# Patient Record
Sex: MALE | Race: WHITE | Employment: FULL TIME | URBAN - METROPOLITAN AREA
[De-identification: names, ages, dates, MRNs, and addresses within clinical notes are randomized per-mention and may not be internally consistent; named-entity substitution may affect disease eponyms.]

---

## 2017-01-03 ENCOUNTER — OFFICE VISIT (OUTPATIENT)
Dept: SURGERY | Facility: CLINIC | Age: 52
End: 2017-01-03

## 2017-01-03 VITALS
WEIGHT: 197 LBS | DIASTOLIC BLOOD PRESSURE: 82 MMHG | RESPIRATION RATE: 16 BRPM | BODY MASS INDEX: 33.63 KG/M2 | SYSTOLIC BLOOD PRESSURE: 124 MMHG | HEIGHT: 64 IN | HEART RATE: 72 BPM

## 2017-01-03 DIAGNOSIS — D35.2 PITUITARY MACROADENOMA WITH EXTRASELLAR EXTENSION (HCC): Primary | ICD-10-CM

## 2017-01-03 DIAGNOSIS — E89.3 STATUS POST TRANSSPHENOIDAL PITUITARY RESECTION (HCC): ICD-10-CM

## 2017-01-03 PROCEDURE — 99212 OFFICE O/P EST SF 10 MIN: CPT | Performed by: NEUROLOGICAL SURGERY

## 2017-01-03 RX ORDER — OMEPRAZOLE 20 MG/1
20 CAPSULE, DELAYED RELEASE ORAL DAILY
Refills: 0 | COMMUNITY
Start: 2016-12-13 | End: 2017-10-02

## 2017-01-03 RX ORDER — FLUTICASONE PROPIONATE 50 MCG
SPRAY, SUSPENSION (ML) NASAL
Refills: 0 | COMMUNITY
Start: 2016-12-17 | End: 2019-07-09 | Stop reason: ALTCHOICE

## 2017-01-03 RX ORDER — IBUPROFEN 600 MG/1
600 TABLET ORAL AS NEEDED
Refills: 0 | COMMUNITY
Start: 2016-12-15 | End: 2018-07-21

## 2017-01-03 NOTE — PATIENT INSTRUCTIONS
,  Refill policies:    • Allow 2 business days for refills; controlled substances may take longer.   • Contact your pharmacy at least 5 days prior to running out of medication and have them send an electronic request or submit request through the “request r Authorizations  If your physician has recommended that you have a procedure or additional testing performed. [facility]) will contact your insurance carrier to obtain pre-certification or prior authorization.     Unfortunately, [facility]

## 2017-01-03 NOTE — PROGRESS NOTES
Neurosurgery Clinic Visit  1/3/2017    Kely Taylor PCP:  Alejandra Smiley MD    1965 MRN [id number]     HISTORY OF PRESENT ILLNESS:  Kely Taylor is a(n) 46year old male who is here s/p trans-sphenoidal pituitary macroadenoma rese

## 2017-01-05 ENCOUNTER — TELEPHONE (OUTPATIENT)
Dept: NEUROLOGY | Facility: CLINIC | Age: 52
End: 2017-01-05

## 2017-01-05 PROCEDURE — 82533 TOTAL CORTISOL: CPT | Performed by: INTERNAL MEDICINE

## 2017-01-05 PROCEDURE — 84305 ASSAY OF SOMATOMEDIN: CPT | Performed by: INTERNAL MEDICINE

## 2017-01-05 PROCEDURE — 84402 ASSAY OF FREE TESTOSTERONE: CPT | Performed by: INTERNAL MEDICINE

## 2017-01-05 PROCEDURE — 84403 ASSAY OF TOTAL TESTOSTERONE: CPT | Performed by: INTERNAL MEDICINE

## 2017-01-05 PROCEDURE — 83930 ASSAY OF BLOOD OSMOLALITY: CPT | Performed by: INTERNAL MEDICINE

## 2017-01-05 PROCEDURE — 83935 ASSAY OF URINE OSMOLALITY: CPT | Performed by: INTERNAL MEDICINE

## 2017-01-05 NOTE — TELEPHONE ENCOUNTER
Spoke to Dasia's at [facility], MRI Pituitary no Misti Dalia is needed.  call CUEVAS#[phone] call time 6:52    Called pt and gave him detail message

## 2017-01-16 NOTE — IMAGING NOTE
I spoke w/pt. Denies asthma or inhaler use. Uses nasal sprays but not inhalers. States he had MRI last year w/contrast w/no problems.  CKRN Done

## 2017-01-18 ENCOUNTER — HOSPITAL ENCOUNTER (OUTPATIENT)
Dept: MRI IMAGING | Age: 52
Discharge: HOME | End: 2017-01-18
Attending: NEUROLOGICAL SURGERY
Payer: MEDICAID

## 2017-01-18 DIAGNOSIS — D35.2 PITUITARY MACROADENOMA WITH EXTRASELLAR EXTENSION (HCC): ICD-10-CM

## 2017-01-18 DIAGNOSIS — E89.3 STATUS POST TRANSSPHENOIDAL PITUITARY RESECTION (HCC): ICD-10-CM

## 2017-01-18 PROCEDURE — A9575 INJ GADOTERATE MEGLUMI 0.1ML: HCPCS | Performed by: NEUROLOGICAL SURGERY

## 2017-01-18 PROCEDURE — 70553 MRI BRAIN STEM W/O & W/DYE: CPT

## 2017-01-26 ENCOUNTER — OFFICE VISIT (OUTPATIENT)
Dept: SURGERY | Facility: CLINIC | Age: 52
End: 2017-01-26

## 2017-01-26 VITALS
DIASTOLIC BLOOD PRESSURE: 68 MMHG | SYSTOLIC BLOOD PRESSURE: 120 MMHG | WEIGHT: 180 LBS | HEIGHT: 64 IN | RESPIRATION RATE: 14 BRPM | HEART RATE: 78 BPM | BODY MASS INDEX: 30.73 KG/M2

## 2017-01-26 DIAGNOSIS — D35.2 PITUITARY MACROADENOMA (HCC): Primary | ICD-10-CM

## 2017-01-26 DIAGNOSIS — E89.3 STATUS POST TRANSSPHENOIDAL PITUITARY RESECTION (HCC): ICD-10-CM

## 2017-01-26 PROCEDURE — 99213 OFFICE O/P EST LOW 20 MIN: CPT | Performed by: NEUROLOGICAL SURGERY

## 2017-01-26 RX ORDER — NAPROXEN 500 MG/1
1 TABLET ORAL
Refills: 0 | COMMUNITY
Start: 2017-01-09 | End: 2018-07-21

## 2017-01-26 RX ORDER — CYCLOBENZAPRINE HCL 10 MG
10 TABLET ORAL
Refills: 0 | COMMUNITY
Start: 2017-01-09 | End: 2018-07-21

## 2017-01-26 NOTE — PATIENT INSTRUCTIONS
Refill policies:    • Allow 2 business days for refills; controlled substances may take longer.   • Contact your pharmacy at least 5 days prior to running out of medication and have them send an electronic request or submit request through the “request re your physician has recommended that you have a procedure or additional testing performed. [facility]) will contact your insurance carrier to obtain pre-certification or prior authorization.     Unfortunately, [facility] has seen an increas

## 2017-01-26 NOTE — PROGRESS NOTES
Neurosurgery Clinic Visit  2017    Keiko Romano PCP:  Keo Pulido MD    1965 MRN [id number]     HISTORY OF PRESENT ILLNESS:  Keiko Romano is a(n) 46year old male who is here s/p trans-sphenoidal pituitary macroadenoma res

## 2017-01-26 NOTE — PROGRESS NOTES
Pt states he has been feeling better, has not been having any headaches, nausea, or any other symptoms

## 2017-02-21 ENCOUNTER — TELEPHONE (OUTPATIENT)
Dept: SURGERY | Facility: CLINIC | Age: 52
End: 2017-02-21

## 2017-05-11 ENCOUNTER — OFFICE VISIT (OUTPATIENT)
Dept: SURGERY | Facility: CLINIC | Age: 52
End: 2017-05-11

## 2017-05-11 VITALS — SYSTOLIC BLOOD PRESSURE: 110 MMHG | HEART RATE: 80 BPM | DIASTOLIC BLOOD PRESSURE: 80 MMHG

## 2017-05-11 DIAGNOSIS — E89.3 STATUS POST TRANSSPHENOIDAL PITUITARY RESECTION (HCC): ICD-10-CM

## 2017-05-11 DIAGNOSIS — R51.9 HEADACHE, UNSPECIFIED HEADACHE TYPE: Primary | ICD-10-CM

## 2017-05-11 PROCEDURE — 99213 OFFICE O/P EST LOW 20 MIN: CPT | Performed by: NEUROLOGICAL SURGERY

## 2017-05-11 NOTE — PATIENT INSTRUCTIONS
Refill policies:    • Allow 2 business days for refills; controlled substances may take longer.   • Contact your pharmacy at least 5 days prior to running out of medication and have them send an electronic request or submit request through the “request re insurance carrier to obtain pre-certification or prior authorization. Unfortunately, [facility] has seen an increase in denial of payment even though the procedure/test has been pre-certified.   You are strongly encouraged to contact your insurance carrier to v

## 2017-05-11 NOTE — PROGRESS NOTES
Pt states he has been having increased headaches for the past 2 weeks, pain currently 7-8/10  Pt states he has been having difficulty with memory, he is also having anxiety due to memory problems. Pt notices he begins to have headaches when he bends over.

## 2017-05-11 NOTE — PROGRESS NOTES
Neurosurgery Clinic Visit  2017    Mesfin Garcia PCP:  Concha Burgess MD    1965 MRN [id number]     HISTORY OF PRESENT ILLNESS:  Mesfin Garcia is a(n) 46year old male here for follow-up  Patient's been having headaches on the

## 2017-06-01 ENCOUNTER — OFFICE VISIT (OUTPATIENT)
Dept: NEUROLOGY | Facility: CLINIC | Age: 52
End: 2017-06-01

## 2017-06-01 VITALS
BODY MASS INDEX: 35 KG/M2 | WEIGHT: 204 LBS | HEART RATE: 70 BPM | DIASTOLIC BLOOD PRESSURE: 76 MMHG | SYSTOLIC BLOOD PRESSURE: 118 MMHG | RESPIRATION RATE: 14 BRPM

## 2017-06-01 DIAGNOSIS — G44.209 TENSION-TYPE HEADACHE, NOT INTRACTABLE, UNSPECIFIED CHRONICITY PATTERN: Primary | ICD-10-CM

## 2017-06-01 DIAGNOSIS — E89.3 STATUS POST TRANSSPHENOIDAL PITUITARY RESECTION (HCC): ICD-10-CM

## 2017-06-01 DIAGNOSIS — D35.2 PITUITARY MACROADENOMA WITH EXTRASELLAR EXTENSION (HCC): ICD-10-CM

## 2017-06-01 PROCEDURE — 99244 OFF/OP CNSLTJ NEW/EST MOD 40: CPT | Performed by: OTHER

## 2017-06-01 RX ORDER — GABAPENTIN 100 MG/1
100 CAPSULE ORAL 3 TIMES DAILY
Qty: 90 CAPSULE | Refills: 1 | Status: SHIPPED | OUTPATIENT
Start: 2017-06-01 | End: 2017-06-26

## 2017-06-01 NOTE — PROGRESS NOTES
Patient here for evaluation of headaches over the lat 2-3 weeks. Pain is in the back of head and radiates to the front. Does have a history of a pituitary resection in 6/2015. Here to discuss the next steps.

## 2017-06-01 NOTE — PROGRESS NOTES
[facility] OUTPATIENT NEUROLOGY CONSULTATION    Date of consult: 6/1/2017    CC: headache    HPI: Kely Taylor is a 46year old male with past medical history as listed below presents here for initial evaluation of headaches over the lat 2-3 weeks.  Pain by Nasal route every 2 (two) hours while awake., Disp: 1 Bottle, Rfl: 5  •  HYDROcodone-acetaminophen (NORCO) 5-325 MG Oral Tab, Take 1 tablet by mouth as needed.   , Disp: , Rfl: 0  Allergies:    Vancomycin              Itching    Comment:Patient reported symmetry  VIII hearing normal  IX, X, XI palate elevates symmetric   XII tongue midline, normal motility, no atrophy  Motor strength: 5/5 all extremities  Tone: normal  DTRs: 2+ symmetric  Plantar response: bilateral flexor  Coordination: Normal FTN  Senso

## 2017-06-01 NOTE — PATIENT INSTRUCTIONS
Refill policies:    • Allow 2-3 business days for refills; controlled substances may take longer.   • Contact your pharmacy at least 5 days prior to running out of medication and have them send an electronic request or submit request through the [facility] have a procedure or additional testing performed. [facility]) will contact your insurance carrier to obtain pre-certification or prior authorization.     Unfortunately, [facility] has seen an increase in denial of payment even though the p

## 2017-06-06 ENCOUNTER — TELEPHONE (OUTPATIENT)
Dept: NEUROLOGY | Facility: CLINIC | Age: 52
End: 2017-06-06

## 2017-06-06 NOTE — TELEPHONE ENCOUNTER
Patient states he started medication Gabapentin yesterday, he notes he took three caps yesterday am, noon and pm. Patient states he experienced numbness on top of his head last night which he stated was \"very bad\".  Patient reports he is feeling better to

## 2017-06-06 NOTE — TELEPHONE ENCOUNTER
Spoke with pateint and relayed Dr Angela Gonzalez response below. Patient will call office with update if symptoms persist or gets worse. F/u appt scheduled with Dr Chloe Farnsworth 6/26/17.

## 2017-06-15 ENCOUNTER — NURSE ONLY (OUTPATIENT)
Dept: NEUROLOGY | Facility: CLINIC | Age: 52
End: 2017-06-15

## 2017-06-15 DIAGNOSIS — R51.9 HEADACHE, UNSPECIFIED HEADACHE TYPE: Primary | ICD-10-CM

## 2017-06-15 DIAGNOSIS — D35.2 PITUITARY MACROADENOMA WITH EXTRASELLAR EXTENSION (HCC): ICD-10-CM

## 2017-06-15 DIAGNOSIS — E89.3 STATUS POST TRANSSPHENOIDAL PITUITARY RESECTION (HCC): ICD-10-CM

## 2017-06-15 PROCEDURE — 95816 EEG AWAKE AND DROWSY: CPT | Performed by: OTHER

## 2017-06-16 NOTE — PROCEDURES
Date of Procedure: 6/15/2017    Procedure: EEG (ELECTROENCEPHALOGRAM)     DX: HEADACHE  HX: PT IS A 52 Y/O MALE THAT PRESENTS FOR RECENT ONSET OF SEVERE HEADACHE THAT BEGAN APPROX 1 MO AGO.  PT PMH OF PITUITARY RESECTION 6/2015, S/P TRANSSPHENOIDAL HYPOPHYS

## 2017-06-20 ENCOUNTER — TELEPHONE (OUTPATIENT)
Dept: NEUROLOGY | Facility: CLINIC | Age: 52
End: 2017-06-20

## 2017-06-20 NOTE — TELEPHONE ENCOUNTER
----- Message from Edouard Hdz MD sent at 6/19/2017  3:17 PM CDT -----  eeg unremarkable. Left detailed message on confidential voicemail (ok per HIPAA)relaying above. Encouraged patient to keep upcoming appt to discuss in more detail and next steps.

## 2017-06-26 ENCOUNTER — OFFICE VISIT (OUTPATIENT)
Dept: NEUROLOGY | Facility: CLINIC | Age: 52
End: 2017-06-26

## 2017-06-26 VITALS
SYSTOLIC BLOOD PRESSURE: 110 MMHG | DIASTOLIC BLOOD PRESSURE: 80 MMHG | WEIGHT: 203 LBS | HEART RATE: 72 BPM | BODY MASS INDEX: 35 KG/M2

## 2017-06-26 DIAGNOSIS — G44.209 TENSION-TYPE HEADACHE, NOT INTRACTABLE, UNSPECIFIED CHRONICITY PATTERN: Primary | ICD-10-CM

## 2017-06-26 DIAGNOSIS — E89.3 STATUS POST TRANSSPHENOIDAL PITUITARY RESECTION (HCC): ICD-10-CM

## 2017-06-26 PROCEDURE — 99215 OFFICE O/P EST HI 40 MIN: CPT | Performed by: OTHER

## 2017-06-26 RX ORDER — TERBINAFINE HYDROCHLORIDE 250 MG/1
TABLET ORAL
Refills: 0 | COMMUNITY
Start: 2017-06-05

## 2017-06-26 RX ORDER — GABAPENTIN 100 MG/1
200 CAPSULE ORAL 3 TIMES DAILY
Qty: 180 CAPSULE | Refills: 2 | Status: SHIPPED | OUTPATIENT
Start: 2017-06-26 | End: 2017-10-02 | Stop reason: DRUGHIGH

## 2017-06-26 NOTE — PATIENT INSTRUCTIONS
Refill policies:    • Allow 2-3 business days for refills; controlled substances may take longer.   • Contact your pharmacy at least 5 days prior to running out of medication and have them send an electronic request or submit request through the [facility] have a procedure or additional testing performed. [facility]) will contact your insurance carrier to obtain pre-certification or prior authorization.     Unfortunately, LOR has seen an increase in denial of payment even though the p

## 2017-06-26 NOTE — PROGRESS NOTES
[facility] Neurology outpatient progress note  Date of service: 6/26/2017    Pt is here for follow up re: headache, since last visit, neurontin seems helping with gradually titrated up dosage. No side effect reported. EEG was unremarkable.   Elvin Hat[employer], Disp: , Rfl: 0  •  omeprazole 20 MG Oral Capsule Delayed Release, Take 20 mg by mouth daily. , Disp: , Rfl: 0  •  ibuprofen 600 MG Oral Tab, Take 600 mg by mouth as needed. , Disp: , Rfl: 0  •  HYDROcodone-acetaminophen (NORCO) 5-325 MG Oral Tab, bilateral flexor   Sensory - nl   Coordination - nl   Gait - nl   Romberg - negative   Neck - supple    Test reviewed on 6/26/2017    A/P:   (G44.209) Tension-type headache, not intractable, unspecified chronicity pattern  (primary encounter diagnosis): im

## 2017-09-15 ENCOUNTER — APPOINTMENT (OUTPATIENT)
Dept: GENERAL RADIOLOGY | Age: 52
End: 2017-09-15
Attending: EMERGENCY MEDICINE
Payer: MEDICAID

## 2017-09-15 ENCOUNTER — HOSPITAL ENCOUNTER (EMERGENCY)
Age: 52
Discharge: HOME | End: 2017-09-15
Attending: EMERGENCY MEDICINE
Payer: MEDICAID

## 2017-09-15 VITALS
OXYGEN SATURATION: 99 % | WEIGHT: 200 LBS | HEIGHT: 63 IN | DIASTOLIC BLOOD PRESSURE: 88 MMHG | SYSTOLIC BLOOD PRESSURE: 128 MMHG | RESPIRATION RATE: 16 BRPM | TEMPERATURE: 97 F | HEART RATE: 65 BPM | BODY MASS INDEX: 35.44 KG/M2

## 2017-09-15 DIAGNOSIS — S61.313A LACERATION OF LEFT MIDDLE FINGER WITHOUT FOREIGN BODY WITH DAMAGE TO NAIL, INITIAL ENCOUNTER: Primary | ICD-10-CM

## 2017-09-15 DIAGNOSIS — S62.635A CLOSED DISPLACED FRACTURE OF DISTAL PHALANX OF LEFT RING FINGER, INITIAL ENCOUNTER: ICD-10-CM

## 2017-09-15 DIAGNOSIS — S62.633B OPEN DISPLACED FRACTURE OF DISTAL PHALANX OF LEFT MIDDLE FINGER, INITIAL ENCOUNTER: ICD-10-CM

## 2017-09-15 DIAGNOSIS — S60.10XA SUBUNGUAL HEMATOMA OF FINGERNAIL, INITIAL ENCOUNTER: ICD-10-CM

## 2017-09-15 PROCEDURE — 26750 TREAT FINGER FRACTURE EACH: CPT

## 2017-09-15 PROCEDURE — 99284 EMERGENCY DEPT VISIT MOD MDM: CPT

## 2017-09-15 PROCEDURE — 99283 EMERGENCY DEPT VISIT LOW MDM: CPT

## 2017-09-15 PROCEDURE — 73130 X-RAY EXAM OF HAND: CPT | Performed by: EMERGENCY MEDICINE

## 2017-09-15 PROCEDURE — 12002 RPR S/N/AX/GEN/TRNK2.6-7.5CM: CPT

## 2017-09-15 PROCEDURE — 11740 EVACUATION SUBUNGUAL HMTMA: CPT

## 2017-09-15 RX ORDER — HYDROCODONE BITARTRATE AND ACETAMINOPHEN 5; 325 MG/1; MG/1
1-2 TABLET ORAL EVERY 6 HOURS PRN
Qty: 20 TABLET | Refills: 0 | Status: SHIPPED | OUTPATIENT
Start: 2017-09-15 | End: 2017-09-19

## 2017-09-15 RX ORDER — HYDROCODONE BITARTRATE AND ACETAMINOPHEN 5; 325 MG/1; MG/1
2 TABLET ORAL ONCE
Status: COMPLETED | OUTPATIENT
Start: 2017-09-15 | End: 2017-09-15

## 2017-09-15 RX ORDER — CLINDAMYCIN HYDROCHLORIDE 300 MG/1
300 CAPSULE ORAL 3 TIMES DAILY
Qty: 30 CAPSULE | Refills: 0 | Status: SHIPPED | OUTPATIENT
Start: 2017-09-15 | End: 2017-09-25

## 2017-09-15 NOTE — ED PROVIDER NOTES
Patient Seen in: THE [facility] Emergency Department In [place]    History   Patient presents with:  Laceration    Stated Complaint:     HPI    The patient is a 80-year-old previously healthy right-hand-dominant male presenting to the emergency department due [09/15/17 1039]  Resp: 20 [09/15/17 1039]  Temp: (!) 97.3 °F (36.3 °C) [09/15/17 1039]  Temp src: Temporal [09/15/17 1039]  SpO2: 96 % [09/15/17 1039]  O2 Device: None (Room air) [09/15/17 1300]    Current:/88   Pulse 65   Temp (!) 97.3 °F (36.3 °C) least a total of 750 mL was used under high pressure. X-ray of his left hand was obtained.   Pictures were taken, and sent to Dr. Hilary Han of orthopedics on call who recommended ED closure via primary intention, prolactin antibiotics and outpatient hand fol explored for foreign bodies and none were found. The edges were approximated using 14 simple interrupted 4-0 nylon sutures and 3 horizontal mattress 4-0 nylon sutures. Bleeding was well-controlled. The patient tolerated the procedure well.   The patient victoria PM    START taking these medications    !! HYDROcodone-acetaminophen 5-325 MG Oral Tab  Take 1-2 tablets by mouth every 6 (six) hours as needed (Do not drive while on his this medication. It will make you sleepy.   Do not take with other Tylenol containing

## 2017-09-15 NOTE — ED INITIAL ASSESSMENT (HPI)
PTA PT LIFTING TRAILER AND CHAIN FROM TRAILER CAME DOWN  ONTO LEFT 3RD FINGER.  SUSTAINED LACERATION AND PARTIAL  DISLOCATION OF FINGERTIP

## 2017-09-17 ENCOUNTER — HOSPITAL ENCOUNTER (EMERGENCY)
Age: 52
Discharge: HOME | End: 2017-09-17
Attending: EMERGENCY MEDICINE
Payer: MEDICAID

## 2017-09-17 VITALS
SYSTOLIC BLOOD PRESSURE: 133 MMHG | HEIGHT: 68 IN | OXYGEN SATURATION: 98 % | BODY MASS INDEX: 30.31 KG/M2 | HEART RATE: 69 BPM | DIASTOLIC BLOOD PRESSURE: 75 MMHG | WEIGHT: 200 LBS | RESPIRATION RATE: 16 BRPM | TEMPERATURE: 98 F

## 2017-09-17 DIAGNOSIS — Z51.89 ENCOUNTER FOR WOUND RE-CHECK: Primary | ICD-10-CM

## 2017-09-17 PROCEDURE — 99281 EMR DPT VST MAYX REQ PHY/QHP: CPT

## 2017-09-17 NOTE — ED NOTES
Pt bandage removed. Pt teaching done on wound care. Pt had not removed bandage or cleaned wound since Friday. Fingers wet/white. Now left open to air and md at bedside.

## 2017-09-17 NOTE — ED PROVIDER NOTES
Patient Seen in: [facility] Emergency Department In [place]    History   Patient presents with:  Laceration Abrasion (integumentary)    Stated Complaint: wound recheck    HPI    66-year-old male presents for a wound check.   Patient injured his left middle noted in HPI. Constitutional and vital signs reviewed. All other systems reviewed and negative except as noted above. PSFH elements reviewed from today and agreed except as otherwise stated in HPI.     Physical Exam   ED Triage Vitals [09/17/17 103 Prescribed:  Current Discharge Medication List
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
## 2017-09-19 PROBLEM — S62.633A CLOSED DISPLACED FRACTURE OF DISTAL PHALANX OF LEFT MIDDLE FINGER, INITIAL ENCOUNTER: Status: ACTIVE | Noted: 2017-09-19

## 2017-09-19 PROBLEM — S62.665A CLOSED NONDISPLACED FRACTURE OF DISTAL PHALANX OF LEFT RING FINGER, INITIAL ENCOUNTER: Status: ACTIVE | Noted: 2017-09-19

## 2017-09-19 RX ORDER — SODIUM CHLORIDE, SODIUM LACTATE, POTASSIUM CHLORIDE, CALCIUM CHLORIDE 600; 310; 30; 20 MG/100ML; MG/100ML; MG/100ML; MG/100ML
INJECTION, SOLUTION INTRAVENOUS CONTINUOUS
Status: CANCELLED | OUTPATIENT
Start: 2017-09-19

## 2017-09-20 NOTE — H&P
Click to print Cherie Cordova 851 for scanning   Office Visit     9/19/2017  [address]   Saul Jamshid, [address]   Physician Assistant   Closed displaced fracture of distal phalanx of left middle finger, initial gabapentin 100 MG Oral Cap Take 2 capsules (200 mg total) by mouth 3 (three) times daily.  Ok to slowly titrate up to 200 mg tid po Disp: 180 capsule Rfl: 2   desmopressin acetate spray (DDAVP) 0.01 % Nasal Solution 1 spray (10 mcg total) by Nasal route argenis Ht 5' 4\" (1.626 m)   Wt 203 lb (92.1 kg)   BMI 34.84 kg/m²   GENERAL:normocehpalic  well developed, well nourished, and in no apparent distress  SKIN: no rashes,no suspicious lesions  MOUTH, NOSE: nasal mucosa pink w/o discharge.  Oropharynx is pink with n There is a nondisplaced proximal metaphyseal fracture of the distal phalanx of the ring finger. No angulation. There is also a tuft fracture of the distal phalanx of the ring finger as well without significant displacement or angulation.  No subluxation   o

## 2017-09-21 ENCOUNTER — SURGERY (OUTPATIENT)
Age: 52
End: 2017-09-21

## 2017-09-21 ENCOUNTER — HOSPITAL ENCOUNTER (OUTPATIENT)
Facility: HOSPITAL | Age: 52
Setting detail: HOSPITAL OUTPATIENT SURGERY
Discharge: HOME | End: 2017-09-21
Attending: ORTHOPAEDIC SURGERY | Admitting: ORTHOPAEDIC SURGERY
Payer: MEDICAID

## 2017-09-21 ENCOUNTER — APPOINTMENT (OUTPATIENT)
Dept: GENERAL RADIOLOGY | Facility: HOSPITAL | Age: 52
End: 2017-09-21
Attending: ORTHOPAEDIC SURGERY
Payer: MEDICAID

## 2017-09-21 VITALS
RESPIRATION RATE: 18 BRPM | SYSTOLIC BLOOD PRESSURE: 142 MMHG | OXYGEN SATURATION: 98 % | BODY MASS INDEX: 34.7 KG/M2 | HEIGHT: 64 IN | TEMPERATURE: 98 F | DIASTOLIC BLOOD PRESSURE: 95 MMHG | HEART RATE: 65 BPM | WEIGHT: 203.25 LBS

## 2017-09-21 PROCEDURE — 0PSV34Z REPOSITION LEFT FINGER PHALANX WITH INTERNAL FIXATION DEVICE, PERCUTANEOUS APPROACH: ICD-10-PCS | Performed by: ORTHOPAEDIC SURGERY

## 2017-09-21 PROCEDURE — 76001 XR FLUOROSCOPE EXAM >1 HR EXTENSIVE (CPT=76001): CPT | Performed by: ORTHOPAEDIC SURGERY

## 2017-09-21 DEVICE — WIRE K SMALL .035: Type: IMPLANTABLE DEVICE | Status: FUNCTIONAL

## 2017-09-21 RX ORDER — BUPIVACAINE HYDROCHLORIDE AND EPINEPHRINE 5; 5 MG/ML; UG/ML
INJECTION, SOLUTION EPIDURAL; INTRACAUDAL; PERINEURAL AS NEEDED
Status: DISCONTINUED | OUTPATIENT
Start: 2017-09-21 | End: 2017-09-21 | Stop reason: HOSPADM

## 2017-09-21 NOTE — BRIEF OP NOTE
Pre-Operative Diagnosis: LEFT MIDDLE FINGER DISTAL PHALANX FRACTURE     Post-Operative Diagnosis: * No post-op diagnosis entered *     Procedure Performed:   Procedure(s):  CLOSED REDUCTION AND PINNING LEFT MIDDLE FINGER DISTAL PHALANX    Surgeon(s) and

## 2017-09-24 ENCOUNTER — HOSPITAL ENCOUNTER (EMERGENCY)
Age: 52
Discharge: HOME | End: 2017-09-24
Attending: EMERGENCY MEDICINE
Payer: MEDICAID

## 2017-09-24 VITALS
DIASTOLIC BLOOD PRESSURE: 84 MMHG | HEART RATE: 76 BPM | HEIGHT: 64 IN | SYSTOLIC BLOOD PRESSURE: 147 MMHG | BODY MASS INDEX: 34.66 KG/M2 | WEIGHT: 203 LBS | OXYGEN SATURATION: 98 % | TEMPERATURE: 98 F | RESPIRATION RATE: 18 BRPM

## 2017-09-24 DIAGNOSIS — T50.905A PILL ESOPHAGITIS: Primary | ICD-10-CM

## 2017-09-24 DIAGNOSIS — K20.80 PILL ESOPHAGITIS: Primary | ICD-10-CM

## 2017-09-24 PROCEDURE — 99283 EMERGENCY DEPT VISIT LOW MDM: CPT

## 2017-09-24 RX ORDER — OMEPRAZOLE 20 MG/1
20 CAPSULE, DELAYED RELEASE ORAL DAILY
Qty: 30 CAPSULE | Refills: 0 | Status: SHIPPED | OUTPATIENT
Start: 2017-09-24 | End: 2017-10-24

## 2017-09-24 RX ORDER — MAGNESIUM HYDROXIDE/ALUMINUM HYDROXICE/SIMETHICONE 120; 1200; 1200 MG/30ML; MG/30ML; MG/30ML
30 SUSPENSION ORAL ONCE
Status: COMPLETED | OUTPATIENT
Start: 2017-09-24 | End: 2017-09-24

## 2017-09-24 RX ORDER — SUCRALFATE ORAL 1 G/10ML
1 SUSPENSION ORAL
Qty: 120 ML | Refills: 0 | Status: SHIPPED | OUTPATIENT
Start: 2017-09-24 | End: 2017-09-27

## 2017-09-24 NOTE — ED INITIAL ASSESSMENT (HPI)
Patient states that he took his antibiotic last night and woke up in the middle of the night he had burning from throat down to stomach and today has had a lot of pain when trying to eat or drink anything

## 2017-09-24 NOTE — ED PROVIDER NOTES
Patient Seen in: THE [facility] Emergency Department In [place]    History   Patient presents with:  Abdomen/Flank Pain (GI/)    Stated Complaint: Felt like pill got stuck last night, today having pain when eating    HPI    15-year-old male presents emergen Smokeless tobacco: Never Used                      Alcohol use: Yes           0.0 oz/week     Comment: rare      Review of Systems    Positive for stated complaint: Felt like pill got stuck last night, today having toni primary care and was given referral to GI. Return to ER if any change worsening symptoms.   Patient is well-appearing in discharge good condition      Disposition and Plan     Clinical Impression:  Pill esophagitis  (primary encounter diagnosis)    Disposi

## 2017-09-25 NOTE — OPERATIVE REPORT
[facility]    PATIENT'S NAME: Ade Raphael   ATTENDING PHYSICIAN: Justina Lemons M.D. OPERATING PHYSICIAN: Justina Lemons M.D.    PATIENT ACCOUNT#:   [de-identified]    LOCATION:  [address] [place] [address] 10  MEDICAL RECORD #:   [id number]

## 2017-10-02 ENCOUNTER — OFFICE VISIT (OUTPATIENT)
Dept: NEUROLOGY | Facility: CLINIC | Age: 52
End: 2017-10-02

## 2017-10-02 VITALS
DIASTOLIC BLOOD PRESSURE: 70 MMHG | HEART RATE: 72 BPM | WEIGHT: 202 LBS | SYSTOLIC BLOOD PRESSURE: 110 MMHG | BODY MASS INDEX: 35 KG/M2

## 2017-10-02 DIAGNOSIS — G47.33 OSA (OBSTRUCTIVE SLEEP APNEA): ICD-10-CM

## 2017-10-02 DIAGNOSIS — G44.209 TENSION-TYPE HEADACHE, NOT INTRACTABLE, UNSPECIFIED CHRONICITY PATTERN: Primary | ICD-10-CM

## 2017-10-02 PROCEDURE — 99215 OFFICE O/P EST HI 40 MIN: CPT | Performed by: OTHER

## 2017-10-02 RX ORDER — GABAPENTIN 300 MG/1
CAPSULE ORAL
Refills: 0 | COMMUNITY
Start: 2017-07-05 | End: 2017-10-02

## 2017-10-02 RX ORDER — POLYETHYLENE GLYCOL-3350 AND ELECTROLYTES 236; 6.74; 5.86; 2.97; 22.74 G/274.31G; G/274.31G; G/274.31G; G/274.31G; G/274.31G
POWDER, FOR SOLUTION ORAL
Refills: 0 | COMMUNITY
Start: 2017-08-25 | End: 2018-07-21

## 2017-10-02 RX ORDER — GABAPENTIN 300 MG/1
CAPSULE ORAL
Qty: 90 CAPSULE | Refills: 5 | Status: SHIPPED | OUTPATIENT
Start: 2017-10-02 | End: 2019-07-09 | Stop reason: ALTCHOICE

## 2017-10-02 NOTE — PROGRESS NOTES
Patient here to follow up for headaches. He states his migraines were under control but it got worse last week when he broke his two fingers on the left hand.  Patient stated his Gabapentin dosage was increased to 900 mg a day which is helping relieve his m

## 2017-10-02 NOTE — PROGRESS NOTES
[facility] Neurology outpatient progress note  Date of service: 10/2/2017    Patient here to follow up for headaches. He states his migraines were under control but it got worse last week when he broke his two fingers on the left hand.  Patient stated his Jeny Sequin Oral Tab, Take 1 tablet by mouth daily as needed. , Disp: , Rfl: 0  •  Fluticasone Propionate 50 MCG/ACT Nasal Suspension, uses as needed. WARNING - [employer] returned a non-compliant response. The tax information is out of balance.  GERMÁN Wt 202 lb   BMI 34.67 kg/m²   A & O X 3   Language - nl   Speech - nl   CN - intact   Motor 5/5 all extremities   Tone - nl   DTRs 2+ symmetric  Plantar response: bilateral flexor   Sensory - nl   Coordination - nl   Gait - nl   Romberg - negative   Neck

## 2017-10-02 NOTE — PATIENT INSTRUCTIONS
Refill policies:    • Allow 2-3 business days for refills; controlled substances may take longer.   • Contact your pharmacy at least 5 days prior to running out of medication and have them send an electronic request or submit request through the [facility] have a procedure or additional testing performed. [facility]) will contact your insurance carrier to obtain pre-certification or prior authorization.     Unfortunately, [facility] has seen an increase in denial of payment even though the p

## 2017-10-10 PROBLEM — S62.633D CLOSED DISPLACED FRACTURE OF DISTAL PHALANX OF LEFT MIDDLE FINGER WITH ROUTINE HEALING, SUBSEQUENT ENCOUNTER: Status: ACTIVE | Noted: 2017-10-10

## 2017-10-10 PROBLEM — S62.665D CLOSED NONDISPLACED FRACTURE OF DISTAL PHALANX OF LEFT RING FINGER WITH ROUTINE HEALING, SUBSEQUENT ENCOUNTER: Status: ACTIVE | Noted: 2017-10-10

## 2017-10-18 NOTE — ED AVS SNAPSHOT
Deann Deleonkelli   MRN: [id number]    Department:  THE [facility] Emergency Department in [place]   Date of Visit:  10/14/2018           Disclosure     Insurance plans vary and the physician(s) referred by the ER may not be covered by your plan.  Please Pt  Resting, no needs at this time  Pt  Reports pain is better  Awaiting ct scan  Will monitor       Vijaya Devries RN  10/18/17 9548 tell this physician (or your personal doctor if your instructions are to return to your personal doctor) about any new or lasting problems. The primary care or specialist physician will see patients referred from the [facility] Emergency Department.  Arthor Bernheim

## 2017-11-29 PROCEDURE — 83930 ASSAY OF BLOOD OSMOLALITY: CPT | Performed by: INTERNAL MEDICINE

## 2017-11-29 PROCEDURE — 83935 ASSAY OF URINE OSMOLALITY: CPT | Performed by: INTERNAL MEDICINE

## 2017-11-29 PROCEDURE — 84305 ASSAY OF SOMATOMEDIN: CPT | Performed by: INTERNAL MEDICINE

## 2017-11-29 PROCEDURE — 84402 ASSAY OF FREE TESTOSTERONE: CPT | Performed by: INTERNAL MEDICINE

## 2017-11-29 PROCEDURE — 84403 ASSAY OF TOTAL TESTOSTERONE: CPT | Performed by: INTERNAL MEDICINE

## 2017-11-29 PROCEDURE — 82533 TOTAL CORTISOL: CPT | Performed by: INTERNAL MEDICINE

## 2018-03-21 ENCOUNTER — TELEPHONE (OUTPATIENT)
Dept: SURGERY | Facility: CLINIC | Age: 53
End: 2018-03-21

## 2018-07-21 ENCOUNTER — APPOINTMENT (OUTPATIENT)
Dept: GENERAL RADIOLOGY | Age: 53
End: 2018-07-21
Attending: PHYSICIAN ASSISTANT
Payer: OTHER MISCELLANEOUS

## 2018-07-21 ENCOUNTER — HOSPITAL ENCOUNTER (EMERGENCY)
Age: 53
Discharge: HOME | End: 2018-07-21
Attending: EMERGENCY MEDICINE
Payer: OTHER MISCELLANEOUS

## 2018-07-21 VITALS
SYSTOLIC BLOOD PRESSURE: 133 MMHG | BODY MASS INDEX: 32.44 KG/M2 | OXYGEN SATURATION: 99 % | RESPIRATION RATE: 19 BRPM | HEART RATE: 71 BPM | DIASTOLIC BLOOD PRESSURE: 86 MMHG | WEIGHT: 190 LBS | HEIGHT: 64 IN | TEMPERATURE: 98 F

## 2018-07-21 DIAGNOSIS — S70.351A FOREIGN BODY OF RIGHT THIGH, INITIAL ENCOUNTER: Primary | ICD-10-CM

## 2018-07-21 DIAGNOSIS — Z18.9 RETAINED FOREIGN BODY: ICD-10-CM
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
## 2018-07-21 PROCEDURE — 99284 EMERGENCY DEPT VISIT MOD MDM: CPT

## 2018-07-21 PROCEDURE — 73560 X-RAY EXAM OF KNEE 1 OR 2: CPT | Performed by: PHYSICIAN ASSISTANT

## 2018-07-21 PROCEDURE — 10120 INC&RMVL FB SUBQ TISS SMPL: CPT

## 2018-07-21 PROCEDURE — 0JCL0ZZ EXTIRPATION OF MATTER FROM RIGHT UPPER LEG SUBCUTANEOUS TISSUE AND FASCIA, OPEN APPROACH: ICD-10-PCS | Performed by: PHYSICIAN ASSISTANT

## 2018-07-21 RX ORDER — BUPIVACAINE HYDROCHLORIDE 5 MG/ML
INJECTION, SOLUTION EPIDURAL; INTRACAUDAL
Status: COMPLETED
Start: 2018-07-21 | End: 2018-07-21

## 2018-07-21 RX ORDER — CEPHALEXIN 500 MG/1
500 CAPSULE ORAL 4 TIMES DAILY
Qty: 28 CAPSULE | Refills: 0 | Status: SHIPPED | OUTPATIENT
Start: 2018-07-21 | End: 2018-07-28

## 2018-07-21 NOTE — ED INITIAL ASSESSMENT (HPI)
Pt states he got a metal wire to his right thigh while he was at work on Tuesday. States Shelley Notice is still in there\".

## 2018-07-21 NOTE — ED PROVIDER NOTES
Patient Seen in: [facility] Emergency Department In [place]    History   Patient presents with:  FB in Skin (integumentary)    Stated Complaint: wire to right thigh, workman comp    59-year-old  male without significant past medical history presen SpO2: 97 %  O2 Device: None (Room air)    Current:/74   Pulse 77   Temp 98.4 °F (36.9 °C) (Temporal)   Resp 20   Ht 162.6 cm (5' 4\")   Wt 86.2 kg   SpO2 97%   BMI 32.61 kg/m²         Physical Exam   Constitutional: He appears well-developed and well Foreign body of right thigh, initial encounter  (primary encounter diagnosis)  Retained foreign body  Work related injury    Disposition:  There is no disposition on file for this visit. There is no disposition time on file for this visit.     Follow-up:

## 2018-07-24 PROBLEM — S70.351A: Status: ACTIVE | Noted: 2018-07-24

## 2018-08-01 ENCOUNTER — HOSPITAL (OUTPATIENT)
Dept: OTHER | Age: 53
End: 2018-08-01
Attending: ORTHOPAEDIC SURGERY

## 2018-10-14 ENCOUNTER — HOSPITAL ENCOUNTER (EMERGENCY)
Age: 53
Discharge: HOME | End: 2018-10-14
Attending: EMERGENCY MEDICINE
Payer: COMMERCIAL

## 2018-10-14 ENCOUNTER — APPOINTMENT (OUTPATIENT)
Dept: GENERAL RADIOLOGY | Age: 53
End: 2018-10-14
Attending: EMERGENCY MEDICINE
Payer: COMMERCIAL

## 2018-10-14 VITALS
HEART RATE: 72 BPM | HEIGHT: 62 IN | TEMPERATURE: 99 F | OXYGEN SATURATION: 97 % | SYSTOLIC BLOOD PRESSURE: 140 MMHG | DIASTOLIC BLOOD PRESSURE: 81 MMHG | RESPIRATION RATE: 18 BRPM | WEIGHT: 200 LBS | BODY MASS INDEX: 36.8 KG/M2

## 2018-10-14 DIAGNOSIS — M54.16 LUMBAR RADICULOPATHY: ICD-10-CM

## 2018-10-14 DIAGNOSIS — S16.1XXA STRAIN OF NECK MUSCLE, INITIAL ENCOUNTER: Primary | ICD-10-CM

## 2018-10-14 PROCEDURE — 73502 X-RAY EXAM HIP UNI 2-3 VIEWS: CPT | Performed by: EMERGENCY MEDICINE

## 2018-10-14 PROCEDURE — 72100 X-RAY EXAM L-S SPINE 2/3 VWS: CPT | Performed by: EMERGENCY MEDICINE

## 2018-10-14 PROCEDURE — 72040 X-RAY EXAM NECK SPINE 2-3 VW: CPT | Performed by: EMERGENCY MEDICINE

## 2018-10-14 PROCEDURE — 72072 X-RAY EXAM THORAC SPINE 3VWS: CPT | Performed by: EMERGENCY MEDICINE

## 2018-10-14 PROCEDURE — 99284 EMERGENCY DEPT VISIT MOD MDM: CPT

## 2018-10-14 PROCEDURE — 96372 THER/PROPH/DIAG INJ SC/IM: CPT

## 2018-10-14 RX ORDER — MORPHINE SULFATE 10 MG/ML
10 INJECTION, SOLUTION INTRAMUSCULAR; INTRAVENOUS EVERY 4 HOURS PRN
Status: DISCONTINUED | OUTPATIENT
Start: 2018-10-14 | End: 2018-10-14

## 2018-10-14 RX ORDER — PREDNISONE 20 MG/1
40 TABLET ORAL DAILY
Qty: 10 TABLET | Refills: 0 | Status: SHIPPED | OUTPATIENT
Start: 2018-10-14 | End: 2018-10-19

## 2018-10-14 RX ORDER — KETOROLAC TROMETHAMINE 30 MG/ML
60 INJECTION, SOLUTION INTRAMUSCULAR; INTRAVENOUS ONCE
Status: COMPLETED | OUTPATIENT
Start: 2018-10-14 | End: 2018-10-14

## 2018-10-14 RX ORDER — HYDROCODONE BITARTRATE AND ACETAMINOPHEN 5; 325 MG/1; MG/1
1-2 TABLET ORAL EVERY 4 HOURS PRN
Qty: 10 TABLET | Refills: 0 | Status: SHIPPED | OUTPATIENT
Start: 2018-10-14 | End: 2018-10-21
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
## 2019-02-25 PROCEDURE — 83930 ASSAY OF BLOOD OSMOLALITY: CPT | Performed by: INTERNAL MEDICINE

## 2019-02-25 PROCEDURE — 84403 ASSAY OF TOTAL TESTOSTERONE: CPT | Performed by: INTERNAL MEDICINE

## 2019-02-25 PROCEDURE — 83935 ASSAY OF URINE OSMOLALITY: CPT | Performed by: INTERNAL MEDICINE

## 2019-02-25 PROCEDURE — 84402 ASSAY OF FREE TESTOSTERONE: CPT | Performed by: INTERNAL MEDICINE

## 2019-07-09 ENCOUNTER — OFFICE VISIT (OUTPATIENT)
Dept: SURGERY | Facility: CLINIC | Age: 54
End: 2019-07-09
Payer: COMMERCIAL

## 2019-07-09 VITALS — HEART RATE: 68 BPM | DIASTOLIC BLOOD PRESSURE: 72 MMHG | SYSTOLIC BLOOD PRESSURE: 124 MMHG

## 2019-07-09 DIAGNOSIS — D35.2 PITUITARY ADENOMA (HCC): Primary | ICD-10-CM

## 2019-07-09 PROCEDURE — 99213 OFFICE O/P EST LOW 20 MIN: CPT | Performed by: NEUROLOGICAL SURGERY

## 2019-07-09 RX ORDER — OMEPRAZOLE 20 MG/1
CAPSULE, DELAYED RELEASE ORAL
Refills: 2 | COMMUNITY
Start: 2019-07-01

## 2019-07-09 RX ORDER — FUROSEMIDE 20 MG/1
TABLET ORAL
COMMUNITY
Start: 2019-07-06

## 2019-07-09 NOTE — PROGRESS NOTES
Neurosurgery Clinic Visit  2019    Wendy Whitney PCP:  Princess Yolanda MD    1965 MRN [id number]     HISTORY OF PRESENT ILLNESS:  Wendy Whitney is a(n) 48year old male here for follow-up status post pituitary resection back in 2

## 2020-03-17 ENCOUNTER — HOSPITAL ENCOUNTER (OUTPATIENT)
Dept: MRI IMAGING | Age: 55
Discharge: HOME | End: 2020-03-17
Attending: NEUROLOGICAL SURGERY
Payer: COMMERCIAL

## 2020-03-17 DIAGNOSIS — D35.2 PITUITARY ADENOMA (HCC): ICD-10-CM

## 2020-03-17 PROCEDURE — 70553 MRI BRAIN STEM W/O & W/DYE: CPT | Performed by: NEUROLOGICAL SURGERY

## 2020-03-17 PROCEDURE — A9575 INJ GADOTERATE MEGLUMI 0.1ML: HCPCS | Performed by: NEUROLOGICAL SURGERY

## 2020-03-19 ENCOUNTER — TELEPHONE (OUTPATIENT)
Dept: SURGERY | Facility: CLINIC | Age: 55
End: 2020-03-19

## 2020-03-26 NOTE — TELEPHONE ENCOUNTER
Spoke with patient who called inquiring about his imaging results. Patient had MRI pituitary completed on 3/17/20.   At [address] [place] [address] on 7/9/19 with Dr. Goldie Arora:    \"ASSESSMENT and PLAN:  51-year-old gentleman status post transsphenoidal hypophysectomy  He is doin

## 2020-03-26 NOTE — TELEPHONE ENCOUNTER
Called pt  Spoke with daughter    Pt is working and unavailable    Pt was wondering how his mri was  It looks good and is stable    Daughter states he was having a feeling of \"worms\" in his head    No explanation on mri    Doesn't need urgent appt    Wade

## 2020-07-06 ENCOUNTER — HOSPITAL ENCOUNTER (EMERGENCY)
Age: 55
Discharge: HOME | End: 2020-07-06
Attending: EMERGENCY MEDICINE
Payer: COMMERCIAL

## 2020-07-06 ENCOUNTER — APPOINTMENT (OUTPATIENT)
Dept: CT IMAGING | Age: 55
End: 2020-07-06
Attending: EMERGENCY MEDICINE
Payer: COMMERCIAL

## 2020-07-06 VITALS
HEART RATE: 59 BPM | BODY MASS INDEX: 30 KG/M2 | OXYGEN SATURATION: 98 % | TEMPERATURE: 99 F | RESPIRATION RATE: 14 BRPM | DIASTOLIC BLOOD PRESSURE: 77 MMHG | WEIGHT: 173 LBS | SYSTOLIC BLOOD PRESSURE: 128 MMHG

## 2020-07-06 DIAGNOSIS — R51.9 HEADACHE DISORDER: Primary | ICD-10-CM

## 2020-07-06 PROCEDURE — 99284 EMERGENCY DEPT VISIT MOD MDM: CPT

## 2020-07-06 PROCEDURE — 96361 HYDRATE IV INFUSION ADD-ON: CPT

## 2020-07-06 PROCEDURE — 96375 TX/PRO/DX INJ NEW DRUG ADDON: CPT

## 2020-07-06 PROCEDURE — 96374 THER/PROPH/DIAG INJ IV PUSH: CPT

## 2020-07-06 PROCEDURE — 70450 CT HEAD/BRAIN W/O DYE: CPT | Performed by: EMERGENCY MEDICINE

## 2020-07-06 RX ORDER — GABAPENTIN 300 MG/1
300 CAPSULE ORAL 3 TIMES DAILY PRN
COMMUNITY

## 2020-07-06 RX ORDER — METOCLOPRAMIDE HYDROCHLORIDE 5 MG/ML
10 INJECTION INTRAMUSCULAR; INTRAVENOUS ONCE
Status: COMPLETED | OUTPATIENT
Start: 2020-07-06 | End: 2020-07-06

## 2020-07-06 RX ORDER — DIPHENHYDRAMINE HYDROCHLORIDE 50 MG/ML
25 INJECTION INTRAMUSCULAR; INTRAVENOUS ONCE
Status: COMPLETED | OUTPATIENT
Start: 2020-07-06 | End: 2020-07-06

## 2020-07-07 NOTE — ED PROVIDER NOTES
Patient Seen in: [facility] Emergency Department In [place]      History   Patient presents with:  Headache    Stated Complaint: head pressure and numbness to scalp    HPI    46 yo with pmh of pituitary tumor s/p resection presents today with head heavine 11/19/2015    Dr. Siva OWENS    • TRANS SPENOIDAL  TRANSNASAL HYPOPHYSECTOMY N/A 6/17/2015    Performed by Lillian Durbin MD at [facility] MAIN OR                    Social History    Tobacco Use      Smoking status: Never Smoker      Smokeless normal.              ED Course   Labs Reviewed - No data to display       Ct Brain Or Head (78428)    Result Date: 7/6/2020  PROCEDURE:  CT BRAIN OR HEAD (27069)  COMPARISON:  [facility], CT, CT BRAIN OR HEAD ([id number]), 5/03/2015, 12:55 PM.  INDICATIONS:  he has completely resolved. CT scan without any acute changes. Given his clinical improvement and reassuring work-up, patient is comfortable with discharge at this time.   He agrees to plan for outpatient follow-up with his primary care doctor and marzena Seymour

## 2020-09-12 ENCOUNTER — HOSPITAL ENCOUNTER (EMERGENCY)
Age: 55
Discharge: HOME | End: 2020-09-12
Attending: EMERGENCY MEDICINE
Payer: COMMERCIAL

## 2020-09-12 VITALS
BODY MASS INDEX: 31.18 KG/M2 | TEMPERATURE: 98 F | HEIGHT: 63 IN | WEIGHT: 176 LBS | SYSTOLIC BLOOD PRESSURE: 134 MMHG | HEART RATE: 75 BPM | DIASTOLIC BLOOD PRESSURE: 76 MMHG | OXYGEN SATURATION: 98 % | RESPIRATION RATE: 18 BRPM

## 2020-09-12 DIAGNOSIS — S61.412A LACERATION OF LEFT HAND WITHOUT FOREIGN BODY, INITIAL ENCOUNTER: Primary | ICD-10-CM

## 2020-09-12 PROCEDURE — 99283 EMERGENCY DEPT VISIT LOW MDM: CPT

## 2020-09-12 PROCEDURE — 12002 RPR S/N/AX/GEN/TRNK2.6-7.5CM: CPT

## 2020-09-12 PROCEDURE — 99282 EMERGENCY DEPT VISIT SF MDM: CPT

## 2020-09-12 NOTE — ED PROVIDER NOTES
Patient Seen in: [facility] Emergency Department In [place]      History   Patient presents with:  Laceration Abrasion    Stated Complaint: laceration to left hand    55-year-old  male without significant past medical history presents to the ER to Alcohol/week: 0.0 standard drinks      Comment: rare    Drug use: No             Review of Systems    Positive for stated complaint: laceration to left hand  Other systems are as noted in HPI. Constitutional and vital signs reviewed.       All other sy Laceration of left hand without foreign body, initial encounter  (primary encounter diagnosis)    Disposition:  Discharge  9/12/2020 11:38 am    Follow-up:  Fady Wong  [phone]      Wound recheck in 2 days, quiros

## 2020-09-12 NOTE — ED INITIAL ASSESSMENT (HPI)
Pt states that he \"picked up some garbage and something sharp cut my hand because it cut my hand\". Pt has laceration to left hand, no bleeding at this time. + CMS.

## 2020-09-24 ENCOUNTER — HOSPITAL ENCOUNTER (EMERGENCY)
Age: 55
Discharge: HOME | End: 2020-09-24
Attending: EMERGENCY MEDICINE
Payer: COMMERCIAL

## 2020-09-24 VITALS
OXYGEN SATURATION: 96 % | SYSTOLIC BLOOD PRESSURE: 137 MMHG | BODY MASS INDEX: 30.12 KG/M2 | HEART RATE: 75 BPM | WEIGHT: 170 LBS | HEIGHT: 63 IN | TEMPERATURE: 98 F | DIASTOLIC BLOOD PRESSURE: 73 MMHG | RESPIRATION RATE: 12 BRPM

## 2020-09-24 DIAGNOSIS — Z48.02 ENCOUNTER FOR REMOVAL OF SUTURES: ICD-10-CM

## 2020-09-24 DIAGNOSIS — T81.41XA INFECTION INVOLVING SUTURE WITH ABSCESS: Primary | ICD-10-CM

## 2020-09-24 RX ORDER — CEPHALEXIN 500 MG/1
500 CAPSULE ORAL 3 TIMES DAILY
Qty: 15 CAPSULE | Refills: 0 | Status: SHIPPED | OUTPATIENT
Start: 2020-09-24 | End: 2020-09-29

## 2020-09-24 NOTE — ED PROVIDER NOTES
Patient presents for suture removal.  He had a laceration to the left hand about 12 days ago.     On inspection, the most medial suture had what appears to be a stitch abscess with minimal surrounding erythema  Sutures were easily removed and the wound edge

## 2020-09-24 NOTE — ED PROVIDER NOTES
Patient Seen in: [facility] Department In [facility]      History   Patient presents with:  Sut Stap RingRemoval: suture removal    Stated Complaint:     HPI    Juvenal Dudley is a 14-year-old male who presents today for suture removal from his left holm can easily be expressed from the 2nd to last suture going from radial to ulnar aspect. This case is discussed with Dr. Emilie Echevarria who evaluates patient and agrees with the plan of care.       MDM    Patient noted to have a small amount of purulent drainage f

## 2022-06-30 NOTE — LETTER
[facility]  Elmer Adams 61 [address] [facility], [address] [place] [address]    Consent for Operation    Date: __________________    Time: _______________    1.  I authorize the performance upon Ratna Galicia the following operation:    Procedure(s):  CLOSED REDUCTI procedure has been videotaped, the surgeon will obtain the original videotape. The hospital will not be responsible for storage or maintenance of this tape.     6. For the purpose of advancing medical education, I consent to the admittance of observers to t STATEMENTS REQUIRING INSERTION OR COMPLETION WERE FILLED IN.     Signature of Patient:   ___________________________    When the patient is a minor or mentally incompetent to give consent:  Signature of person authorized to consent for patient: ____________ supplements, and pills I can buy without a prescription (including street drugs/illegal medications). Failure to inform my anesthesiologist about these medicines may increase my risk of anesthetic complications.   · If I am allergic to anything or have had Anesthesiologist Signature     Date   Time  I have discussed the procedure and information above with the patient (or patient’s representative) and answered their questions. The patient or their representative has agreed to have anesthesia services.     ___ General: well-appearing young man in no acute distress  Head: normocephalic, bandaged head, hematoma on right posterior parietal scalp  Eyes: PERRL, EOMI  Mouth: moist mucous membranes  Neck: in c-collar, no midline spinal tenderness to palpation  CV: normal rate and rhythm, no LE edema, peripheral pulses 2+ bilateral UE and LE  Respiratory: clear to auscultation bilaterally  Abdomen: soft, nondistended, nontender  : no suprapubic tenderness, no CVAT  MSK: no joint deformities, full ROM of all extremity joints, no pelvis instability, no midline spinal tenderness to palpation  Neuro: alert and oriented x3, speech clear, CN III-XII intact, moving all extremities spontaneously without difficulty  Skin: 4cm irregular laceration on scalp, superficial abrasion on right knee

## 2022-07-28 ENCOUNTER — LAB SERVICES (OUTPATIENT)
Dept: LAB | Age: 57
End: 2022-07-28

## 2022-07-28 DIAGNOSIS — Z01.812 PRE-PROCEDURE LAB EXAM: ICD-10-CM

## 2022-07-28 PROCEDURE — U0005 INFEC AGEN DETEC AMPLI PROBE: HCPCS | Performed by: INTERNAL MEDICINE

## 2022-07-28 PROCEDURE — U0003 INFECTIOUS AGENT DETECTION BY NUCLEIC ACID (DNA OR RNA); SEVERE ACUTE RESPIRATORY SYNDROME CORONAVIRUS 2 (SARS-COV-2) (CORONAVIRUS DISEASE [COVID-19]), AMPLIFIED PROBE TECHNIQUE, MAKING USE OF HIGH THROUGHPUT TECHNOLOGIES AS DESCRIBED BY CMS-2020-01-R: HCPCS | Performed by: INTERNAL MEDICINE

## 2022-07-29 ENCOUNTER — TELEPHONE (OUTPATIENT)
Dept: SCHEDULING | Age: 57
End: 2022-07-29

## 2022-07-29 LAB
SARS-COV-2 RNA RESP QL NAA+PROBE: NOT DETECTED
SERVICE CMNT-IMP: NORMAL
SERVICE CMNT-IMP: NORMAL

## 2022-07-29 RX ORDER — OMEPRAZOLE 40 MG/1
CAPSULE, DELAYED RELEASE ORAL
COMMUNITY
Start: 2022-05-17

## 2022-07-30 ENCOUNTER — HOSPITAL ENCOUNTER (OUTPATIENT)
Dept: ADMINISTRATIVE | Age: 57
Discharge: HOME | End: 2022-07-30
Attending: SPECIALIST

## 2022-07-30 ENCOUNTER — ANESTHESIA EVENT (OUTPATIENT)
Dept: ADMINISTRATIVE | Age: 57
End: 2022-07-30

## 2022-07-30 ENCOUNTER — ANESTHESIA (OUTPATIENT)
Dept: ADMINISTRATIVE | Age: 57
End: 2022-07-30

## 2022-07-30 VITALS
SYSTOLIC BLOOD PRESSURE: 120 MMHG | DIASTOLIC BLOOD PRESSURE: 85 MMHG | HEART RATE: 65 BPM | BODY MASS INDEX: 32.78 KG/M2 | TEMPERATURE: 97.5 F | RESPIRATION RATE: 19 BRPM | OXYGEN SATURATION: 100 % | HEIGHT: 63 IN | WEIGHT: 185 LBS

## 2022-07-30 DIAGNOSIS — R93.5 ABNORMAL FINDINGS ON DIAGNOSTIC IMAGING OF OTHER ABDOMINAL REGIONS, INCLUDING RETROPERITONEUM: ICD-10-CM

## 2022-07-30 PROCEDURE — 13000024 HB GI COMPLEX CASE S/U + 1ST 15 MIN

## 2022-07-30 PROCEDURE — 13000008 HB ANESTHESIA MAC OUTSIDE OR

## 2022-07-30 PROCEDURE — 10002807 HB RX 258: Performed by: ANESTHESIOLOGY

## 2022-07-30 PROCEDURE — 10002800 HB RX 250 W HCPCS: Performed by: ANESTHESIOLOGY

## 2022-07-30 PROCEDURE — 13000001 HB PHASE II RECOVERY EA 30 MINUTES

## 2022-07-30 RX ORDER — PROPOFOL 10 MG/ML
INJECTION, EMULSION INTRAVENOUS PRN
Status: DISCONTINUED | OUTPATIENT
Start: 2022-07-30 | End: 2022-07-30

## 2022-07-30 RX ORDER — SODIUM CHLORIDE 9 MG/ML
INJECTION, SOLUTION INTRAVENOUS CONTINUOUS PRN
Status: DISCONTINUED | OUTPATIENT
Start: 2022-07-30 | End: 2022-07-30

## 2022-07-30 RX ADMIN — SODIUM CHLORIDE: 9 INJECTION, SOLUTION INTRAVENOUS at 07:46

## 2022-07-30 RX ADMIN — PROPOFOL 60 MG: 10 INJECTION, EMULSION INTRAVENOUS at 07:50

## 2022-07-30 RX ADMIN — PROPOFOL 75 MCG/KG/MIN: 10 INJECTION, EMULSION INTRAVENOUS at 07:51

## 2022-07-30 ASSESSMENT — ENCOUNTER SYMPTOMS: EXERCISE TOLERANCE: GOOD (>4 METS)

## 2022-07-30 ASSESSMENT — PAIN SCALES - GENERAL
PAINLEVEL_OUTOF10: 0

## 2022-12-30 ENCOUNTER — TELEPHONE (OUTPATIENT)
Dept: SURGERY | Facility: CLINIC | Age: 57
End: 2022-12-30

## 2022-12-30 DIAGNOSIS — E89.3 STATUS POST TRANSSPHENOIDAL PITUITARY RESECTION (HCC): Primary | ICD-10-CM

## 2022-12-30 NOTE — TELEPHONE ENCOUNTER
Pt calling to request MRI of head , states after sx he was getting MRI every 3 to 6 months and Dr had recommended to do another one after a yr to make sure tumor has not came back pt states going on to 1 1/2 does Dr want to see him in office first or can he order MRI first and then follow up with Dr, please advise

## 2023-01-03 NOTE — TELEPHONE ENCOUNTER
Noted Providers feedback listed below     [facility] [phone]  [place]cuad[place] Interpretor ID : [id number] [id number]    Called pt to inform imaging requested was ordered and to follow up with Dr. Vickey Goodell after imaging is completed for review. No answer, Interpretor left message to inform pt that imaging requested was ordered and to call back our office with any other questions or concerns.      Call was ended

## 2023-01-06 NOTE — TELEPHONE ENCOUNTER
French speaking Mic Oyster called and informed pt of needed imaging and follow up.     Provided pt with # to central scheduling for MRI and # to our office for appointment

## 2023-01-18 ENCOUNTER — HOSPITAL ENCOUNTER (OUTPATIENT)
Dept: MRI IMAGING | Age: 58
Discharge: HOME | End: 2023-01-18
Attending: NURSE PRACTITIONER
Payer: COMMERCIAL

## 2023-01-18 DIAGNOSIS — E89.3 STATUS POST TRANSSPHENOIDAL PITUITARY RESECTION (HCC): ICD-10-CM

## 2023-01-18 PROCEDURE — A9575 INJ GADOTERATE MEGLUMI 0.1ML: HCPCS | Performed by: NURSE PRACTITIONER

## 2023-01-18 PROCEDURE — 70553 MRI BRAIN STEM W/O & W/DYE: CPT | Performed by: NURSE PRACTITIONER

## 2023-01-18 RX ORDER — GADOTERATE MEGLUMINE 376.9 MG/ML
18 INJECTION INTRAVENOUS
Status: COMPLETED | OUTPATIENT
Start: 2023-01-18 | End: 2023-01-18

## 2023-01-18 RX ADMIN — GADOTERATE MEGLUMINE 18 ML: 376.9 INJECTION INTRAVENOUS at 18:08:00

## 2023-01-31 ENCOUNTER — OFFICE VISIT (OUTPATIENT)
Dept: SURGERY | Facility: CLINIC | Age: 58
End: 2023-01-31
Payer: COMMERCIAL

## 2023-01-31 VITALS
HEART RATE: 73 BPM | DIASTOLIC BLOOD PRESSURE: 82 MMHG | SYSTOLIC BLOOD PRESSURE: 128 MMHG | BODY MASS INDEX: 33.66 KG/M2 | HEIGHT: 63 IN | WEIGHT: 190 LBS

## 2023-01-31 DIAGNOSIS — E89.3 STATUS POST TRANSSPHENOIDAL PITUITARY RESECTION (HCC): Primary | ICD-10-CM

## 2023-01-31 PROCEDURE — 3074F SYST BP LT 130 MM HG: CPT | Performed by: NEUROLOGICAL SURGERY

## 2023-01-31 PROCEDURE — 99212 OFFICE O/P EST SF 10 MIN: CPT | Performed by: NEUROLOGICAL SURGERY

## 2023-01-31 PROCEDURE — 3079F DIAST BP 80-89 MM HG: CPT | Performed by: NEUROLOGICAL SURGERY

## 2023-01-31 PROCEDURE — 3008F BODY MASS INDEX DOCD: CPT | Performed by: NEUROLOGICAL SURGERY

## 2023-01-31 RX ORDER — CIPROFLOXACIN 500 MG/1
500 TABLET, FILM COATED ORAL 2 TIMES DAILY
COMMUNITY
Start: 2023-01-26

## 2023-09-07 NOTE — LETTER
Date & Time: 7/21/2018, 4:42 PM  Patient: Lala Hernandez  Encounter Provider(s):    MD Valeria River PA       Occupational restrictions  For: Lala Sprawls      Patient has retained foreign body in his right thigh.   [facility] Complex Repair And Flap Additional Text (Will Appearing After The Standard Complex Repair Text): The complex repair was not sufficient to completely close the primary defect. The remaining additional defect was repaired with the flap mentioned below.

## 2023-12-23 ENCOUNTER — HOSPITAL ENCOUNTER (EMERGENCY)
Age: 58
Discharge: HOME | End: 2023-12-23
Payer: COMMERCIAL

## 2023-12-23 ENCOUNTER — APPOINTMENT (OUTPATIENT)
Dept: GENERAL RADIOLOGY | Age: 58
End: 2023-12-23
Payer: COMMERCIAL

## 2023-12-23 VITALS
OXYGEN SATURATION: 97 % | RESPIRATION RATE: 16 BRPM | SYSTOLIC BLOOD PRESSURE: 154 MMHG | HEART RATE: 82 BPM | BODY MASS INDEX: 35 KG/M2 | DIASTOLIC BLOOD PRESSURE: 85 MMHG | TEMPERATURE: 98 F | WEIGHT: 195 LBS

## 2023-12-23 DIAGNOSIS — S69.91XA INJURY, HAND, RIGHT, INITIAL ENCOUNTER: Primary | ICD-10-CM

## 2023-12-23 PROCEDURE — 99283 EMERGENCY DEPT VISIT LOW MDM: CPT

## 2023-12-23 PROCEDURE — 73130 X-RAY EXAM OF HAND: CPT

## 2023-12-28 ENCOUNTER — TELEPHONE (OUTPATIENT)
Dept: ORTHOPEDICS CLINIC | Facility: CLINIC | Age: 58
End: 2023-12-28

## 2023-12-28 NOTE — TELEPHONE ENCOUNTER
Patient called for right hand pain.  Xrays in epic, please advise if additional is needed  Future Appointments   Date Time Provider Jono Knight   1/22/2024 11:40 AM GARRISON Calero [facility] [id number]

## (undated) DEVICE — DRAPE C-ARM UNIVERSAL

## (undated) DEVICE — SOL  .9 1000ML BTL

## (undated) DEVICE — UPPER EXTREMITY CDS-LF: Brand: MEDLINE INDUSTRIES, INC.

## (undated) DEVICE — PIN GUARD 0.0135 BLUE

## (undated) DEVICE — PADDING CAST WYTEX 2\" STER

## (undated) DEVICE — KENDALL SCD EXPRESS SLEEVES, KNEE LENGTH, MEDIUM: Brand: KENDALL SCD

## (undated) DEVICE — CONVERTORS STOCKINETTE: Brand: CONVERTORS

## (undated) DEVICE — CAST TAPE SYNTH 2

## (undated) DEVICE — GLOVE SURG TRIUMPH SZ 8

## (undated) DEVICE — STOCKINETTE SYNTH 2\" ROLL

## (undated) DEVICE — GLOVE SURG TRIUMPH SZ 71/2

## (undated) NOTE — MR AVS SNAPSHOT
[address], [address] [place] [address] [phone]               Thank you for choosing us for your health care visit with Álvaro Cano MD.  We are glad to serve you and happy to provide you with th schedule your appointment.        [employer]  [address], [address], [address] [place] [address], Rena  [id number]     [facility]  E longer. ? Contact your pharmacy at least 5 days prior to running out of medication and have them send an electronic request or submit request through the “request refill” option in your [employer] account. ?  Refills are not addressed on weekends; covering p Unfortunately, LOR has seen an increase in denial of payment even though the procedure/test has been pre-certified.   You are strongly encouraged to contact your insurance carrier to verify that your procedure/test has been approved and is a COVERED benefit Take 50 mg by mouth every 6 (six) hours as needed for Pain.    Commonly known as:  ULTRAM                   MyChart                  Visit [facility] online at  [email or web address]

## (undated) NOTE — MR AVS SNAPSHOT
[facility] [address]  [address] [address]  [facility] [address] [phone]               Thank you for choosing us for your health care visit with EEGPLFD.   We are glad to serve you and happy to provide you with this summa Plan [employer]   Commonly known as:  FLONASE           gabapentin 100 MG Caps   Take 1 capsule (100 mg total) by mouth 3 (three) times daily.    Commonly known as:  NEURONTIN           HYDROcodone-acetaminophen 5-325 MG Tabs   Take 1 tablet by mouth as nee

## (undated) NOTE — MR AVS SNAPSHOT
[address], [address] [place] [address] [phone]               Thank you for choosing us for your health care visit with Cameron Ellsworth MD.  We are glad to serve you and happy to provide you with th of individual in advance and they must present an ID as well. ? The name of the person picking up your prescription must be documented in your chart.   Scheduling Tests    If your physician has ordered radiology tests such as MRI or CT scans, do not schedu BP Pulse Height Weight BMI    120/68 mmHg 78 64\" 180 lb 30.88 kg/m2         Current Medications          This list is accurate as of: 1/26/17 10:40 AM.  Always use your most recent med list.                Cyclobenzaprine HCl 10 MG Tabs   Take 10 mg by m

## (undated) NOTE — ED AVS SNAPSHOT
Shiva Hinojosa   MRN: [id number]    Department:  [facility] Emergency Department in [place]   Date of Visit:  7/21/2018           Disclosure     Insurance plans vary and the physician(s) referred by the ER may not be covered by your plan.  Please tell this physician (or your personal doctor if your instructions are to return to your personal doctor) about any new or lasting problems. The primary care or specialist physician will see patients referred from the [facility] Emergency Department.  Vangie Lin

## (undated) NOTE — ED AVS SNAPSHOT
Elvin Hatchet   MRN: [id number]    Department:  [facility] Emergency Department in [place]   Date of Visit:  9/15/2017           Disclosure     Insurance plans vary and the physician(s) referred by the ER may not be covered by your plan.  Please If you have been prescribed any medication(s), please fill your prescription right away and begin taking the medication(s) as directed    If the emergency physician has read X-rays, these will be re-interpreted by a radiologist.  If there is a significant

## (undated) NOTE — ED AVS SNAPSHOT
Trevon Anaya   MRN: [id number]    Department:  THE [facility] Emergency Department in [place]   Date of Visit:  9/24/2017           Disclosure     Insurance plans vary and the physician(s) referred by the ER may not be covered by your plan.  Please If you have been prescribed any medication(s), please fill your prescription right away and begin taking the medication(s) as directed    If the emergency physician has read X-rays, these will be re-interpreted by a radiologist.  If there is a significant

## (undated) NOTE — ED AVS SNAPSHOT
Deniseaugusto Bao   MRN: [id number]    Department:  THE [facility] Emergency Department in [place]   Date of Visit:  9/17/2017           Disclosure     Insurance plans vary and the physician(s) referred by the ER may not be covered by your plan.  Please If you have been prescribed any medication(s), please fill your prescription right away and begin taking the medication(s) as directed    If the emergency physician has read X-rays, these will be re-interpreted by a radiologist.  If there is a significant

## (undated) NOTE — LETTER
[facility]  Phone: [phone]  ANTIBIOTIC ALLERGY/SENSITIVITY/CONTRAINDICATION    Sent By:  Anika Cabezas Date: 9/19/17    Patient Name: Alfred Escalante  Surgery Date: 9/21/2017    CSN: [id number]  Medical Record: [id number] copying of this information is strictly prohibited. If you have received this transmission in error, please notify us immediately by telephone, and return the original documents to us at the address listed above.

## (undated) NOTE — LETTER
[facility]  [address] 61 [address] [facility], [address] [place] [address]    Consent for Operation    Date: __________________    Time: _______________    1.  I authorize the performance upon Harriston Sieving the following operation:    Procedure(s):  CLOSED REDUCTI procedure has been videotaped, the surgeon will obtain the original videotape. The hospital will not be responsible for storage or maintenance of this tape.     6. For the purpose of advancing medical education, I consent to the admittance of observers to t STATEMENTS REQUIRING INSERTION OR COMPLETION WERE FILLED IN.     Signature of Patient:   ___________________________    When the patient is a minor or mentally incompetent to give consent:  Signature of person authorized to consent for patient: ____________ supplements, and pills I can buy without a prescription (including street drugs/illegal medications). Failure to inform my anesthesiologist about these medicines may increase my risk of anesthetic complications.   · If I am allergic to anything or have had Anesthesiologist Signature     Date   Time  I have discussed the procedure and information above with the patient (or patient’s representative) and answered their questions. The patient or their representative has agreed to have anesthesia services.     ___

## (undated) NOTE — MR AVS SNAPSHOT
[facility] [address]  [address] [address]. [place] [facility] [address] [phone]               Thank you for choosing us for your health care visit with Eddie Gutierrez MD.  We are glad to serve you and happy to provide you with ? Contact your pharmacy at least 5 days prior to running out of medication and have them send an electronic request or submit request through the “request refill” option in your [employer] account. ?  Refills are not addressed on weekends; covering physicians testing performed. [facility]) will contact your insurance carrier to obtain pre-certification or prior authorization.     Unfortunately, LOR has seen an increase in denial of payment even though the procedure/test has been pre-cert Vancomycin Itching    Patient reported itching at iv site in arm, rated as \"severe\"    Penicillins Other (See Comments)    Tremors                  Today's Vital Signs     BP Pulse Weight             118/76 mmHg 70 204 lb            Current Medications Visit [facility] online at  [email or web address]

## (undated) NOTE — MR AVS SNAPSHOT
[address], [address] [place] [address] [phone]               Thank you for choosing us for your health care visit with Joseph Chavez MD.  We are glad to serve you and happy to provide you with th ? Patient must present photo ID at time of . If a designated family member will be picking up prescription, office must be given name of individual in advance and they must present an ID as well. ?  The name of the person picking up your prescripti [address] ([facility])    [facility] [address] [address] [phone]              Allergies as of Jan 03, 2017     Vancomycin Itching    Patient reported itching at iv site in arm, rated as \"severe\" [facility]   at [phone].          Referral Information     Referral Order Referred to Address [facility] Phone Visits Status Diagnosis                                     MRI PITUITARY (W+WO) (CPT=70553) [[id number]]     1 Open [2] Pituita